# Patient Record
(demographics unavailable — no encounter records)

---

## 2025-04-28 NOTE — PLAN
[FreeTextEntry1] : 20-year-old female for dysuria.  Urine dip with trace leukocytes.  Will treat with Macrobid given symptoms.  Culture sent.  Adequate hydration advised.  Signs and symptoms warranting further eval with discussed.    Anxiety.  Stable.  Continue Lexapro 10 mg daily.    All questions answered.  Patient voiced understanding and agreement to plan.  Return to clinic as recommended.

## 2025-04-28 NOTE — PHYSICAL EXAM
[No Acute Distress] : no acute distress [Well Nourished] : well nourished [Well Developed] : well developed [Well-Appearing] : well-appearing [Normal Voice/Communication] : normal voice/communication [Normal Sclera/Conjunctiva] : normal sclera/conjunctiva [No Respiratory Distress] : no respiratory distress  [Normal Rate] : normal rate  [Soft] : abdomen soft [No Rash] : no rash [Speech Grossly Normal] : speech grossly normal [Normal Affect] : the affect was normal [Normal Mood] : the mood was normal

## 2025-04-28 NOTE — HISTORY OF PRESENT ILLNESS
[FreeTextEntry8] : 20-year-old female for follow-up visit.  Patient noting urinary discomfort since the 25th.